# Patient Record
Sex: FEMALE | Race: BLACK OR AFRICAN AMERICAN | NOT HISPANIC OR LATINO | Employment: UNEMPLOYED
[De-identification: names, ages, dates, MRNs, and addresses within clinical notes are randomized per-mention and may not be internally consistent; named-entity substitution may affect disease eponyms.]

---

## 2018-02-04 ENCOUNTER — HOSPITAL (OUTPATIENT)
Dept: OTHER | Age: 33
End: 2018-02-04
Attending: EMERGENCY MEDICINE

## 2019-07-09 ENCOUNTER — HOSPITAL ENCOUNTER (OUTPATIENT)
Age: 34
Discharge: HOME | End: 2019-07-09
Attending: FAMILY MEDICINE
Payer: COMMERCIAL

## 2019-07-09 VITALS
WEIGHT: 235 LBS | TEMPERATURE: 99 F | BODY MASS INDEX: 44.37 KG/M2 | OXYGEN SATURATION: 100 % | HEIGHT: 61 IN | HEART RATE: 66 BPM | DIASTOLIC BLOOD PRESSURE: 80 MMHG | RESPIRATION RATE: 18 BRPM | SYSTOLIC BLOOD PRESSURE: 146 MMHG

## 2019-07-09 DIAGNOSIS — H02.843 PAIN AND SWELLING OF EYELIDS OF BOTH EYES: Primary | ICD-10-CM

## 2019-07-09 DIAGNOSIS — H02.846 PAIN AND SWELLING OF EYELIDS OF BOTH EYES: Primary | ICD-10-CM

## 2019-07-09 DIAGNOSIS — H02.89 PAIN AND SWELLING OF EYELIDS OF BOTH EYES: Primary | ICD-10-CM

## 2019-07-09 PROCEDURE — 99203 OFFICE O/P NEW LOW 30 MIN: CPT

## 2019-07-09 PROCEDURE — 99204 OFFICE O/P NEW MOD 45 MIN: CPT

## 2019-07-09 RX ORDER — METHYLPREDNISOLONE 4 MG/1
TABLET ORAL
Qty: 1 PACKAGE | Refills: 0 | Status: SHIPPED | OUTPATIENT
Start: 2019-07-09 | End: 2019-07-14

## 2019-07-09 RX ORDER — KETOCONAZOLE 20 MG/G
1 CREAM TOPICAL 2 TIMES DAILY
Qty: 30 G | Refills: 1 | Status: SHIPPED | OUTPATIENT
Start: 2019-07-09

## 2019-07-09 NOTE — ED PROVIDER NOTES
Patient Seen in: [facility]    History   Patient presents with:   Allergic Rxn Allergies (immune)    Stated Complaint: possible alleergic reaction    HPI    36yo F presents to IC with 2-3 days of itching, flaking and redn Lymphadenopathy:     She has no cervical adenopathy. Skin: She is not diaphoretic. Nursing note and vitals reviewed. ED Course   Labs Reviewed - No data to display      MDM   Possible contact dermatitis vs seborrheic dermatitis.  Started on Med

## 2019-07-09 NOTE — ED INITIAL ASSESSMENT (HPI)
Pt states having an allergic reaction that has developed over the last 2 days. Pt states starting with swelling under left eye and has now spread all over face. Pt states was itchy has been taking benadryl. Pt states no new foods or products.  Pt denies swe

## 2019-07-26 ENCOUNTER — HOSPITAL ENCOUNTER (OUTPATIENT)
Age: 34
Discharge: HOME | End: 2019-07-26
Attending: EMERGENCY MEDICINE
Payer: COMMERCIAL

## 2019-07-26 VITALS
HEART RATE: 82 BPM | WEIGHT: 235 LBS | DIASTOLIC BLOOD PRESSURE: 94 MMHG | TEMPERATURE: 98 F | SYSTOLIC BLOOD PRESSURE: 139 MMHG | RESPIRATION RATE: 18 BRPM | BODY MASS INDEX: 44.37 KG/M2 | HEIGHT: 61 IN | OXYGEN SATURATION: 100 %

## 2019-07-26 DIAGNOSIS — T78.40XA ALLERGIC REACTION, INITIAL ENCOUNTER: Primary | ICD-10-CM

## 2019-07-26 PROCEDURE — 99214 OFFICE O/P EST MOD 30 MIN: CPT

## 2019-07-26 PROCEDURE — 99213 OFFICE O/P EST LOW 20 MIN: CPT

## 2019-07-26 RX ORDER — PREDNISONE 20 MG/1
40 TABLET ORAL DAILY
Qty: 10 TABLET | Refills: 0 | Status: SHIPPED | OUTPATIENT
Start: 2019-07-26 | End: 2019-07-31

## 2019-07-26 NOTE — ED PROVIDER NOTES
Patient Seen in: [facility]    History   Patient presents with:   Allergic Rxn Allergies (immune)    Stated Complaint: possilbe allergic reaction    HPI    28-year-old female patient presents her complaining of radicular examination  Psych: Appropriate, not agitated      ED Course   Labs Reviewed - No data to display           MDM   Discussed treatment plan with patient. Will provide steroids.   Patient states that she will  H1 antihistamine and H2, ranitidine/Pepci

## 2019-07-26 NOTE — ED INITIAL ASSESSMENT (HPI)
Pt states was in IC about a month ago for similar. Pt states last night began developing swelling under neath left eye. Pt denies any exposure to anything that would be triggering swelling. Pt states starting to have itching on chest and under arms.  Pt den

## 2020-11-18 ENCOUNTER — TELEPHONE (OUTPATIENT)
Dept: OBGYN CLINIC | Facility: CLINIC | Age: 35
End: 2020-11-18

## 2020-11-18 NOTE — TELEPHONE ENCOUNTER
Would like to establish prenatal care. New patient. Scheduled appointment in via [address] and needs to be switched to a nurse education visit.     Please advise

## 2020-11-18 NOTE — TELEPHONE ENCOUNTER
Called pt in regards to message below. Pt states she is transferring form [facility] due to pt needing to rotate through OBs at that practice. Informed pt she would need to see all 6 Providers both female and male throughout her pregnancy.  Pt does not agree to th

## 2022-08-23 ENCOUNTER — HOSPITAL ENCOUNTER (EMERGENCY)
Age: 37
Discharge: HOME | End: 2022-08-23

## 2022-08-23 VITALS
OXYGEN SATURATION: 97 % | DIASTOLIC BLOOD PRESSURE: 90 MMHG | SYSTOLIC BLOOD PRESSURE: 150 MMHG | RESPIRATION RATE: 18 BRPM | WEIGHT: 260.14 LBS | TEMPERATURE: 97.7 F | HEART RATE: 77 BPM

## 2022-08-23 DIAGNOSIS — J02.9 PHARYNGITIS, UNSPECIFIED ETIOLOGY: Primary | ICD-10-CM

## 2022-08-23 LAB
FLUAV RNA RESP QL NAA+PROBE: NOT DETECTED
FLUBV RNA RESP QL NAA+PROBE: NOT DETECTED
RSV AG NPH QL IA.RAPID: NOT DETECTED
S PYO DNA THROAT QL NAA+PROBE: NOT DETECTED
SARS-COV-2 RNA RESP QL NAA+PROBE: NOT DETECTED
SERVICE CMNT-IMP: NORMAL
SERVICE CMNT-IMP: NORMAL

## 2022-08-23 PROCEDURE — C9803 HOPD COVID-19 SPEC COLLECT: HCPCS

## 2022-08-23 PROCEDURE — 87651 STREP A DNA AMP PROBE: CPT | Performed by: NURSE PRACTITIONER

## 2022-08-23 PROCEDURE — 0241U COVID/FLU/RSV PANEL: CPT | Performed by: NURSE PRACTITIONER

## 2022-08-23 PROCEDURE — 99282 EMERGENCY DEPT VISIT SF MDM: CPT

## 2022-08-23 ASSESSMENT — ENCOUNTER SYMPTOMS
HEADACHES: 0
VOMITING: 0
STRIDOR: 0
ENDOCRINE NEGATIVE: 1
CONSTITUTIONAL NEGATIVE: 1
TROUBLE SWALLOWING: 0
EYES NEGATIVE: 1
RESPIRATORY NEGATIVE: 1
SORE THROAT: 1
DIARRHEA: 0
SWOLLEN GLANDS: 0
HEMATOLOGIC/LYMPHATIC NEGATIVE: 1
SHORTNESS OF BREATH: 0
ALLERGIC/IMMUNOLOGIC NEGATIVE: 1
COUGH: 0
GASTROINTESTINAL NEGATIVE: 1
NEUROLOGICAL NEGATIVE: 1
PSYCHIATRIC NEGATIVE: 1